# Patient Record
Sex: MALE | Race: WHITE | Employment: PART TIME | URBAN - NONMETROPOLITAN AREA
[De-identification: names, ages, dates, MRNs, and addresses within clinical notes are randomized per-mention and may not be internally consistent; named-entity substitution may affect disease eponyms.]

---

## 2018-02-28 ENCOUNTER — HOSPITAL ENCOUNTER (EMERGENCY)
Age: 53
Discharge: HOME | End: 2018-02-28

## 2018-02-28 ENCOUNTER — APPOINTMENT (OUTPATIENT)
Dept: GENERAL RADIOLOGY | Age: 53
End: 2018-02-28

## 2018-02-28 VITALS
HEART RATE: 97 BPM | DIASTOLIC BLOOD PRESSURE: 93 MMHG | TEMPERATURE: 98.4 F | BODY MASS INDEX: 27.05 KG/M2 | WEIGHT: 205 LBS | SYSTOLIC BLOOD PRESSURE: 207 MMHG | RESPIRATION RATE: 18 BRPM | OXYGEN SATURATION: 97 %

## 2018-02-28 DIAGNOSIS — M70.22 OLECRANON BURSITIS OF LEFT ELBOW: Primary | ICD-10-CM

## 2018-02-28 PROCEDURE — 87205 SMEAR GRAM STAIN: CPT

## 2018-02-28 PROCEDURE — 89060 EXAM SYNOVIAL FLUID CRYSTALS: CPT

## 2018-02-28 PROCEDURE — 83605 ASSAY OF LACTIC ACID: CPT

## 2018-02-28 PROCEDURE — 87070 CULTURE OTHR SPECIMN AEROBIC: CPT

## 2018-02-28 PROCEDURE — 84157 ASSAY OF PROTEIN OTHER: CPT

## 2018-02-28 PROCEDURE — 89051 BODY FLUID CELL COUNT: CPT

## 2018-02-28 PROCEDURE — 73080 X-RAY EXAM OF ELBOW: CPT

## 2018-02-28 PROCEDURE — 87075 CULTR BACTERIA EXCEPT BLOOD: CPT

## 2018-02-28 PROCEDURE — 99283 EMERGENCY DEPT VISIT LOW MDM: CPT

## 2018-02-28 PROCEDURE — 83615 LACTATE (LD) (LDH) ENZYME: CPT

## 2018-02-28 RX ORDER — IBUPROFEN 600 MG/1
600 TABLET ORAL EVERY 6 HOURS PRN
Qty: 30 TABLET | Refills: 0 | Status: SHIPPED | OUTPATIENT
Start: 2018-02-28

## 2018-02-28 RX ORDER — METHYLPREDNISOLONE 4 MG/1
TABLET ORAL
Qty: 1 KIT | Refills: 0 | Status: SHIPPED | OUTPATIENT
Start: 2018-02-28 | End: 2018-03-06

## 2018-02-28 ASSESSMENT — PAIN DESCRIPTION - PAIN TYPE: TYPE: ACUTE PAIN

## 2018-02-28 ASSESSMENT — ENCOUNTER SYMPTOMS
NAUSEA: 0
BACK PAIN: 0
EYE REDNESS: 0
SHORTNESS OF BREATH: 0
WHEEZING: 0
EYE DISCHARGE: 0
SORE THROAT: 0
VOMITING: 0
COUGH: 0
CONSTIPATION: 0
RHINORRHEA: 0
ABDOMINAL PAIN: 0
DIARRHEA: 0
COLOR CHANGE: 0

## 2018-02-28 ASSESSMENT — PAIN DESCRIPTION - ORIENTATION: ORIENTATION: LEFT

## 2018-02-28 ASSESSMENT — PAIN SCALES - GENERAL: PAINLEVEL_OUTOF10: 4

## 2018-02-28 ASSESSMENT — PAIN DESCRIPTION - LOCATION: LOCATION: ELBOW

## 2018-02-28 ASSESSMENT — PAIN DESCRIPTION - DESCRIPTORS: DESCRIPTORS: DULL

## 2018-03-01 LAB
CHARACTER,SYN.FL: ABNORMAL
COLOR FLUID: ABNORMAL
CRYSTALS, FLUID: ABNORMAL
EOSINOPHIL FLUID: 4 %
LD, FLUID: 1100 U/L
LYMPHOCYTE, SYNOVIAL FLUID: 7 % (ref 25–95)
MONOCYTE, FLUID: 77 % (ref 25–95)
NRBC FLUID: ABNORMAL %
PROTEIN FLUID: 4.8 GM/DL
SEGS, SYNOVIAL FLUID: 12 % (ref 0–25)
WBC FLUID: 460 MM3

## 2018-03-01 NOTE — ED PROVIDER NOTES
are equal, round, and reactive to light. Right eye exhibits no discharge. Left eye exhibits no discharge. No scleral icterus. Neck: Normal range of motion. Neck supple. Cardiovascular: Normal rate, regular rhythm, normal heart sounds and intact distal pulses. Exam reveals no gallop and no friction rub. No murmur heard. Pulses:       Radial pulses are 2+ on the right side, and 2+ on the left side. Capillary refill is less than 3 seconds. Pulmonary/Chest: Effort normal and breath sounds normal. No respiratory distress. He has no wheezes. He has no rales. He exhibits no tenderness. Abdominal: Soft. He exhibits no distension. Musculoskeletal: He exhibits edema and tenderness. He exhibits no deformity. Left shoulder: Normal. He exhibits normal range of motion, no tenderness, no swelling, no crepitus, no deformity and normal strength. Left elbow: He exhibits decreased range of motion and effusion. He exhibits no deformity. Tenderness found. Olecranon process tenderness noted. Left wrist: Normal. He exhibits normal range of motion, no tenderness, no swelling, no crepitus and no deformity. Left upper arm: Normal. He exhibits no tenderness, no swelling and no deformity. Left forearm: Normal. He exhibits no tenderness, no swelling and no deformity. There is mild edema and tenderness without erythema or warmth of the left elbow in the region of the olecranon process consistent with olecranon bursitis. There is no induration or fluctuance to suggest abscess formation. Patient has full ROM and strength of the LUE. There is intact sensation of soft touch in the LUE. The LUE appears to be neurovascularly intact. Lymphadenopathy:     He has no cervical adenopathy. Neurological: He is alert and oriented to person, place, and time. He exhibits normal muscle tone. Coordination normal. GCS eye subscore is 4. GCS verbal subscore is 5. GCS motor subscore is 6.    Skin: Skin is this was believed to be secondary to pain and his history of HTN. He will need to follow up with his PCP for further BP monitoring. On exam, I appreciated mild edema and tenderness without erythema or warmth of the left elbow in the region of the olecranon process consistent with olecranon bursitis. There is no induration or fluctuance to suggest abscess formation. Patient has full ROM and strength of the LUE. There is intact sensation of soft touch in the LUE. The LUE appears to be neurovascularly intact. Radiologic studies within the department revealed mild soft tissue swelling over the extensor surface of the left elbow. There is no soft tissue gas. Findings are consistent with left olecranon bursitis. Within the department, Dr. Deborah Phipps performed a needle aspiration of the left olecranon bursa, see his note for procedure details. The patient reported immediate relief of his left elbow pain after this procedure. The aspirated fluid was sent for analysis. An Ace bandage was applied on the left elbow. I observed the patient's condition to improve during the duration of the stay. I explained my proposed course of treatment to the patient, who was amenable to my treatment and discharge decisions. He was discharged home in stable condition with prescriptions for Motrin and a Medrol Dosepak, and the patient will return to the ED if the symptoms become more severe in nature or otherwise change. He'll return if there is worsening edema or patient develops erythema of the left elbow, purulent drainage from the left elbow, or fevers. CRITICAL CARE:   None    CONSULTS:   None    PROCEDURES:  See Dr. Carrillo Martinez note for needle aspiration of left olecranon bursa    FINAL IMPRESSION      1.  Olecranon bursitis of left elbow          DISPOSITION/PLAN    I have given the patient strict written and verbal instructions about care at home, follow-up, and signs and symptoms of worsening of condition, and the patient did verbalize

## 2018-03-02 LAB
Lab: 5.8 MMOL/L
SPECIMEN SOURCE: NORMAL

## 2018-03-05 LAB
ANAEROBIC CULTURE: NORMAL
BODY FLUID CULTURE, STERILE: NORMAL
GRAM STAIN RESULT: NORMAL